# Patient Record
Sex: MALE
[De-identification: names, ages, dates, MRNs, and addresses within clinical notes are randomized per-mention and may not be internally consistent; named-entity substitution may affect disease eponyms.]

---

## 2019-12-27 PROBLEM — Z00.00 ENCOUNTER FOR PREVENTIVE HEALTH EXAMINATION: Status: ACTIVE | Noted: 2019-12-27

## 2020-01-02 ENCOUNTER — APPOINTMENT (OUTPATIENT)
Dept: ORTHOPEDIC SURGERY | Facility: CLINIC | Age: 30
End: 2020-01-02
Payer: OTHER MISCELLANEOUS

## 2020-01-02 VITALS
BODY MASS INDEX: 38.65 KG/M2 | HEART RATE: 80 BPM | WEIGHT: 270 LBS | SYSTOLIC BLOOD PRESSURE: 127 MMHG | HEIGHT: 70 IN | DIASTOLIC BLOOD PRESSURE: 84 MMHG | TEMPERATURE: 98.1 F

## 2020-01-02 DIAGNOSIS — S80.02XA CONTUSION OF LEFT KNEE, INITIAL ENCOUNTER: ICD-10-CM

## 2020-01-02 DIAGNOSIS — F17.200 NICOTINE DEPENDENCE, UNSPECIFIED, UNCOMPLICATED: ICD-10-CM

## 2020-01-02 DIAGNOSIS — Z80.9 FAMILY HISTORY OF MALIGNANT NEOPLASM, UNSPECIFIED: ICD-10-CM

## 2020-01-02 DIAGNOSIS — Z78.9 OTHER SPECIFIED HEALTH STATUS: ICD-10-CM

## 2020-01-02 DIAGNOSIS — Z72.89 OTHER PROBLEMS RELATED TO LIFESTYLE: ICD-10-CM

## 2020-01-02 DIAGNOSIS — M79.89 OTHER SPECIFIED SOFT TISSUE DISORDERS: ICD-10-CM

## 2020-01-02 PROCEDURE — 73560 X-RAY EXAM OF KNEE 1 OR 2: CPT | Mod: LT

## 2020-01-02 PROCEDURE — 99203 OFFICE O/P NEW LOW 30 MIN: CPT

## 2020-01-03 PROBLEM — Z78.9 NO PERTINENT PAST MEDICAL HISTORY: Status: RESOLVED | Noted: 2020-01-03 | Resolved: 2020-01-03

## 2020-01-03 PROBLEM — F17.200 CURRENT SMOKER: Status: ACTIVE | Noted: 2020-01-03

## 2020-01-03 PROBLEM — Z80.9 FAMILY HISTORY OF MALIGNANT NEOPLASM: Status: ACTIVE | Noted: 2020-01-03

## 2020-01-03 PROBLEM — Z72.89 CONSUMES ALCOHOL: Status: ACTIVE | Noted: 2020-01-03

## 2020-01-03 PROBLEM — Z78.9 NEVER EXERCISES: Status: ACTIVE | Noted: 2020-01-03

## 2020-01-06 ENCOUNTER — APPOINTMENT (OUTPATIENT)
Dept: ORTHOPEDIC SURGERY | Facility: CLINIC | Age: 30
End: 2020-01-06
Payer: OTHER MISCELLANEOUS

## 2020-01-06 VITALS
SYSTOLIC BLOOD PRESSURE: 121 MMHG | BODY MASS INDEX: 38.65 KG/M2 | HEART RATE: 79 BPM | DIASTOLIC BLOOD PRESSURE: 88 MMHG | TEMPERATURE: 98.2 F | WEIGHT: 270 LBS | HEIGHT: 70 IN

## 2020-01-06 DIAGNOSIS — S80.02XD CONTUSION OF LEFT KNEE, SUBSEQUENT ENCOUNTER: ICD-10-CM

## 2020-01-06 PROCEDURE — 99212 OFFICE O/P EST SF 10 MIN: CPT

## 2020-01-07 NOTE — DISCUSSION/SUMMARY
[de-identified] : Patient presented today with contusion of the left knee.  At this time, I have recommended a Duplex sonogram to rule out DVT.  He is being referred for such.  He will be reassessed once that is done.  \par \par THIS IS A FORMAL REQUEST FOR AUTHORIZATION FOR A DUPLEX SONOGRAM FOR THE LEFT LOWER EXTREMITY.  \par \par The Workers' Compensation guidelines have been followed.\par

## 2020-01-07 NOTE — PHYSICAL EXAM
[de-identified] : Ambulating with a slightly antalgic to antalgic gait.  Station:  Normal.  [de-identified] : Right Knee:\par Knee: Range of Motion in Degrees	\par 	                  Claimant/Normal:\par 		\par Flexion Active            135                        135-degrees\par Flexion Passive	  135	                135-degrees	\par Extension Active	  0-5	                0-5-degrees	\par Extension Passive	  0-5	                0-5-degrees	\par \par No weakness to flexion/extension.  No evidence of instability in the AP plane or varus or valgus stress.  Negative  Lachman.  Negative pivot shift.  Negative anterior drawer test.  Negative posterior drawer test.  Negative Dewey.  Negative Apley grind.  No medial or lateral joint line tenderness.  No tenderness over the medial and lateral facet of the patella.  No patellofemoral crepitations.  No lateral tilting patella.  No patellar apprehension.  No crepitation in the medial and lateral femoral condyle.  No proximal or distal swelling, edema or tenderness.  No gross motor or sensory deficits.  No intra-articular swelling.  2+ DP and PT pulses. No varus or valgus malalignment.  Skin is intact.  No rashes, scars or lesions.  \par  \par Left Knee:\par Knee: Range of Motion in Degrees	\par 	                  Claimant/Normal:\par 		\par Flexion Active            135                        135-degrees\par Flexion Passive	  135	                135-degrees	\par Extension Active	  0-5	                0-5-degrees	\par Extension Passive	  0-5	                0-5-degrees	\par \par Diffusely tender more so in the popliteal fossa and in the calf with significant swelling distally.   No weakness to flexion/extension.  No evidence of instability in the AP plane or varus or valgus stress.  Negative  Lachman.  Negative pivot shift.  Negative anterior drawer test.  Negative posterior drawer test.  Negative Dewey.  Negative Apley grind.  No patellofemoral crepitations.  No lateral tilting patella.  No patellar apprehension.  No crepitation in the medial and lateral femoral condyle.  No gross motor or sensory deficits.  2+ DP and PT pulses. No varus or valgus malalignment.  Skin is intact.  No rashes, scars or lesions.  \par   [de-identified] : General Appearance:  Well-developed, well-nourished male in no acute distress. \par  [de-identified] : Radiographs, two views of the left knee, show no obvious osseous abnormalities.

## 2020-01-07 NOTE — ADDENDUM
[FreeTextEntry1] : This note was written by Amanda Argueta on 01/03/2020 acting as scribe for Pedro Vásquez III, MD

## 2020-01-07 NOTE — HISTORY OF PRESENT ILLNESS
[de-identified] : The patient comes in today status post an injury on 19 while he was working at [employer].  A dog came from behind and wrapped his leash around him and he went down.  He is having persistent complaints of pain and swelling.  This injury is as a result of a work related injury, as noted above.  The patient states the pain is radiating.  The patient describes the pain as achy, burning and throbbing.  The patient states rest and ice makes the pain better while bending and kneeling makes the pain worse.\par \par Ailment Interference:  \par Daily Livin/10\par Normal Work:  -8/10\par Sleep:  7/10\par Enjoyment of Life:  4/10\par Climbing Stairs:  6/10\par Sports:  8/10\par Extra-Curricular Activities:  8/10 [] : No

## 2020-01-07 NOTE — REASON FOR VISIT
[Initial Visit] : an initial visit for [FreeTextEntry2] : his left knee.  This visit is related to a Workers' Compensation injury

## 2020-01-16 NOTE — ADDENDUM
[FreeTextEntry1] : This note was written by Amanda Argueta on 01/10/2020 acting as scribe for Pedro Vásquez III, MD

## 2020-01-16 NOTE — HISTORY OF PRESENT ILLNESS
[de-identified] : The patient comes in today for his left leg.  He states he is feeling much better.  His Duplex sonogram is as noted below.

## 2020-01-16 NOTE — PHYSICAL EXAM
[de-identified] : Left Knee:\par Range of Motion in Degrees	\par 	  Claimant/Normal:\par 		\par Flexion Active 135  135-degrees\par Flexion Passive	 135	 135-degrees	\par Extension Active	 0-5	 0-5-degrees	\par Extension Passive	 0-5	 0-5-degrees	\par \par Diffusely tender more so in the popliteal fossa and in the calf with less swelling distally than previous visit. No weakness to flexion/extension. No evidence of instability in the AP plane or varus or valgus stress. Negative Lachman. Negative pivot shift. Negative anterior drawer test. Negative posterior drawer test. Negative Dewey. Negative Apley grind. No patellofemoral crepitations. No lateral tilting patella. No patellar apprehension. No crepitation in the medial and lateral femoral condyle. No gross motor or sensory deficits. 2+ DP and PT pulses. No varus or valgus malalignment. Skin is intact. No rashes, scars or lesions. \par  [de-identified] : Ambulating with a normal gait.  Station:  Normal.  [de-identified] : General Appearance:  Well-developed, well-nourished male in no acute distress. \par  [de-identified] : Duplex sonogram was negative.

## 2020-01-16 NOTE — REASON FOR VISIT
[Follow-Up Visit] : a follow-up visit for [FreeTextEntry2] : his left leg.  This visit is related to a Workers' Compensation injury

## 2020-01-16 NOTE — DISCUSSION/SUMMARY
[de-identified] : Patient is doing well overall status post left leg contusion.  At this time, I have recommended a calf compression sleeve.  I am allowing him to return to work.  \par \par The Workers' Compensation guidelines have been followed.\par